# Patient Record
Sex: FEMALE | Race: BLACK OR AFRICAN AMERICAN | URBAN - METROPOLITAN AREA
[De-identification: names, ages, dates, MRNs, and addresses within clinical notes are randomized per-mention and may not be internally consistent; named-entity substitution may affect disease eponyms.]

---

## 2021-02-01 ENCOUNTER — OFFICE VISIT (OUTPATIENT)
Dept: URBAN - METROPOLITAN AREA CLINIC 118 | Facility: CLINIC | Age: 66
End: 2021-02-01

## 2021-03-22 ENCOUNTER — OFFICE VISIT (OUTPATIENT)
Dept: URBAN - METROPOLITAN AREA CLINIC 118 | Facility: CLINIC | Age: 66
End: 2021-03-22
Payer: COMMERCIAL

## 2021-03-22 VITALS
DIASTOLIC BLOOD PRESSURE: 85 MMHG | TEMPERATURE: 96.7 F | WEIGHT: 167 LBS | HEART RATE: 64 BPM | BODY MASS INDEX: 27.82 KG/M2 | HEIGHT: 65 IN | SYSTOLIC BLOOD PRESSURE: 132 MMHG

## 2021-03-22 DIAGNOSIS — K21.00 GASTROESOPHAGEAL REFLUX DISEASE WITH ESOPHAGITIS WITHOUT HEMORRHAGE: ICD-10-CM

## 2021-03-22 DIAGNOSIS — Z86.010 PERSONAL HISTORY OF COLON POLYPS: ICD-10-CM

## 2021-03-22 PROCEDURE — 99213 OFFICE O/P EST LOW 20 MIN: CPT | Performed by: INTERNAL MEDICINE

## 2021-03-22 RX ORDER — PANTOPRAZOLE SODIUM 40 MG/1
1 TABLET TABLET, DELAYED RELEASE ORAL ONCE A DAY
Qty: 90 | Refills: 3 | OUTPATIENT
Start: 2021-03-22

## 2021-03-22 RX ORDER — OMEPRAZOLE 40 MG/1
CAPSULE, DELAYED RELEASE PELLETS ORAL
Qty: 0 | Refills: 0 | Status: ACTIVE | COMMUNITY
Start: 1900-01-01

## 2021-03-22 RX ORDER — AMLODIPINE BESYLATE 10 MG/1
TAKE 1 TABLET (10 MG) BY ORAL ROUTE ONCE DAILY TABLET ORAL 1
Qty: 0 | Refills: 0 | Status: ACTIVE | COMMUNITY
Start: 1900-01-01

## 2021-03-22 RX ORDER — TRAMADOL HYDROCHLORIDE 50 MG/1
TABLET ORAL
Qty: 0 | Refills: 0 | Status: ON HOLD | COMMUNITY
Start: 1900-01-01

## 2021-03-22 RX ORDER — TOPIRAMATE 25 MG/1
TABLET, FILM COATED ORAL
Qty: 0 | Refills: 0 | Status: ACTIVE | COMMUNITY
Start: 1900-01-01

## 2021-03-22 RX ORDER — FAMOTIDINE 40 MG/1
1 TABLET AT BEDTIME TABLET, FILM COATED ORAL ONCE A DAY
Qty: 90 | Refills: 1 | OUTPATIENT
Start: 2021-03-22

## 2021-03-22 RX ORDER — SUMATRIPTAN SUCCINATE 50 MG/1
TABLET, FILM COATED ORAL
Qty: 0 | Refills: 0 | Status: ON HOLD | COMMUNITY
Start: 1900-01-01

## 2021-03-22 RX ORDER — MECLIZINE HYDROCHLORIDE 25 MG/1
TABLET ORAL
Qty: 0 | Refills: 0 | Status: ACTIVE | COMMUNITY
Start: 1900-01-01

## 2021-03-22 RX ORDER — GLIPIZIDE 10 MG/1
TABLET ORAL
Qty: 0 | Refills: 0 | Status: ACTIVE | COMMUNITY
Start: 1900-01-01

## 2021-03-22 NOTE — HPI-TODAY'S VISIT:
patient reports   on omeprazole no help EGD 2019 mild LA Grade A esophagitis   last seen 2019, HP pos, treated quad therapy, repeat testing was negative   On omeprazole without help  her appointment was 12:30 but she was seen aroudn 2 (I asked when she turned in her paperwork as I saw later appointments before her, she didn't want to answer as was annoyed at the long wait)

## 2021-08-28 ENCOUNTER — TELEPHONE ENCOUNTER (OUTPATIENT)
Dept: URBAN - METROPOLITAN AREA CLINIC 13 | Facility: CLINIC | Age: 66
End: 2021-08-28

## 2021-08-29 ENCOUNTER — TELEPHONE ENCOUNTER (OUTPATIENT)
Dept: URBAN - METROPOLITAN AREA CLINIC 13 | Facility: CLINIC | Age: 66
End: 2021-08-29

## 2021-10-12 ENCOUNTER — ERX REFILL RESPONSE (OUTPATIENT)
Dept: URBAN - METROPOLITAN AREA CLINIC 118 | Facility: CLINIC | Age: 66
End: 2021-10-12

## 2021-10-12 RX ORDER — FAMOTIDINE 40 MG/1
1 TABLET AT BEDTIME ONCE A DAY ORALLY 90 DAYS TABLET, FILM COATED ORAL
Qty: 90 TABLET | Refills: 1 | OUTPATIENT

## 2021-10-12 RX ORDER — FAMOTIDINE 40 MG/1
1 TABLET AT BEDTIME TABLET, FILM COATED ORAL ONCE A DAY
Qty: 90 | Refills: 1 | OUTPATIENT

## 2021-10-25 ENCOUNTER — OFFICE VISIT (OUTPATIENT)
Dept: URBAN - METROPOLITAN AREA SURGERY CENTER 23 | Facility: SURGERY CENTER | Age: 66
End: 2021-10-25

## 2021-11-23 ENCOUNTER — OFFICE VISIT (OUTPATIENT)
Dept: URBAN - METROPOLITAN AREA SURGERY CENTER 23 | Facility: SURGERY CENTER | Age: 66
End: 2021-11-23

## 2022-01-25 ENCOUNTER — OFFICE VISIT (OUTPATIENT)
Dept: URBAN - METROPOLITAN AREA SURGERY CENTER 23 | Facility: SURGERY CENTER | Age: 67
End: 2022-01-25

## 2022-02-23 PROBLEM — 428283002 HISTORY OF POLYP OF COLON: Status: ACTIVE | Noted: 2021-03-22

## 2022-03-01 ENCOUNTER — CLAIMS CREATED FROM THE CLAIM WINDOW (OUTPATIENT)
Dept: URBAN - METROPOLITAN AREA SURGERY CENTER 23 | Facility: SURGERY CENTER | Age: 67
End: 2022-03-01

## 2022-03-01 ENCOUNTER — CLAIMS CREATED FROM THE CLAIM WINDOW (OUTPATIENT)
Dept: URBAN - METROPOLITAN AREA CLINIC 4 | Facility: CLINIC | Age: 67
End: 2022-03-01
Payer: COMMERCIAL

## 2022-03-01 ENCOUNTER — CLAIMS CREATED FROM THE CLAIM WINDOW (OUTPATIENT)
Dept: URBAN - METROPOLITAN AREA SURGERY CENTER 23 | Facility: SURGERY CENTER | Age: 67
End: 2022-03-01
Payer: COMMERCIAL

## 2022-03-01 DIAGNOSIS — D12.4 ADENOMA OF DESCENDING COLON: ICD-10-CM

## 2022-03-01 DIAGNOSIS — Z86.010 ADENOMAS PERSONAL HISTORY OF COLONIC POLYPS: ICD-10-CM

## 2022-03-01 DIAGNOSIS — D12.4 BENIGN NEOPLASM OF DESCENDING COLON: ICD-10-CM

## 2022-03-01 PROCEDURE — 88305 TISSUE EXAM BY PATHOLOGIST: CPT | Performed by: PATHOLOGY

## 2022-03-01 PROCEDURE — G8907 PT DOC NO EVENTS ON DISCHARG: HCPCS | Performed by: INTERNAL MEDICINE

## 2022-03-01 PROCEDURE — 45380 COLONOSCOPY AND BIOPSY: CPT | Performed by: INTERNAL MEDICINE

## 2022-03-01 RX ORDER — TRAMADOL HYDROCHLORIDE 50 MG/1
TABLET ORAL
Qty: 0 | Refills: 0 | Status: ON HOLD | COMMUNITY
Start: 1900-01-01

## 2022-03-01 RX ORDER — SUMATRIPTAN SUCCINATE 50 MG/1
TABLET, FILM COATED ORAL
Qty: 0 | Refills: 0 | Status: ON HOLD | COMMUNITY
Start: 1900-01-01

## 2022-03-01 RX ORDER — OMEPRAZOLE 40 MG/1
CAPSULE, DELAYED RELEASE PELLETS ORAL
Qty: 0 | Refills: 0 | Status: ACTIVE | COMMUNITY
Start: 1900-01-01

## 2022-03-01 RX ORDER — FAMOTIDINE 40 MG/1
1 TABLET AT BEDTIME ONCE A DAY ORALLY 90 DAYS TABLET, FILM COATED ORAL
Qty: 90 TABLET | Refills: 1 | Status: ACTIVE | COMMUNITY

## 2022-03-01 RX ORDER — GLIPIZIDE 10 MG/1
TABLET ORAL
Qty: 0 | Refills: 0 | Status: ACTIVE | COMMUNITY
Start: 1900-01-01

## 2022-03-01 RX ORDER — TOPIRAMATE 25 MG/1
TABLET, FILM COATED ORAL
Qty: 0 | Refills: 0 | Status: ACTIVE | COMMUNITY
Start: 1900-01-01

## 2022-03-01 RX ORDER — PANTOPRAZOLE SODIUM 40 MG/1
1 TABLET TABLET, DELAYED RELEASE ORAL ONCE A DAY
Qty: 90 | Refills: 3 | Status: ACTIVE | COMMUNITY
Start: 2021-03-22

## 2022-03-01 RX ORDER — MECLIZINE HYDROCHLORIDE 25 MG/1
TABLET ORAL
Qty: 0 | Refills: 0 | Status: ACTIVE | COMMUNITY
Start: 1900-01-01

## 2022-03-01 RX ORDER — AMLODIPINE BESYLATE 10 MG/1
TAKE 1 TABLET (10 MG) BY ORAL ROUTE ONCE DAILY TABLET ORAL 1
Qty: 0 | Refills: 0 | Status: ACTIVE | COMMUNITY
Start: 1900-01-01

## 2022-03-28 ENCOUNTER — ERX REFILL RESPONSE (OUTPATIENT)
Dept: URBAN - METROPOLITAN AREA CLINIC 118 | Facility: CLINIC | Age: 67
End: 2022-03-28

## 2022-03-28 RX ORDER — PANTOPRAZOLE 40 MG/1
1 TABLET ONCE A DAY ORALLY 90 DAYS TABLET, DELAYED RELEASE ORAL
Qty: 90 TABLET | Refills: 3 | OUTPATIENT

## 2022-03-28 RX ORDER — PANTOPRAZOLE SODIUM 40 MG/1
1 TABLET TABLET, DELAYED RELEASE ORAL ONCE A DAY
Qty: 90 | Refills: 3 | OUTPATIENT

## 2022-04-04 ENCOUNTER — ERX REFILL RESPONSE (OUTPATIENT)
Dept: URBAN - METROPOLITAN AREA CLINIC 118 | Facility: CLINIC | Age: 67
End: 2022-04-04

## 2022-04-04 RX ORDER — FAMOTIDINE 40 MG/1
1 TABLET AT BEDTIME ONCE A DAY ORALLY 90 DAYS TABLET, FILM COATED ORAL
Qty: 90 TABLET | Refills: 1 | OUTPATIENT

## 2022-07-21 ENCOUNTER — ERX REFILL RESPONSE (OUTPATIENT)
Dept: URBAN - METROPOLITAN AREA CLINIC 118 | Facility: CLINIC | Age: 67
End: 2022-07-21

## 2022-07-21 RX ORDER — FAMOTIDINE 40 MG/1
1 TABLET AT BEDTIME ONCE A DAY ORALLY 90 DAYS TABLET, FILM COATED ORAL
Qty: 90 TABLET | Refills: 1 | OUTPATIENT

## 2022-07-21 RX ORDER — FAMOTIDINE 40 MG/1
1 TABLET AT BEDTIME ONCE A DAY ORALLY 90 DAYS TABLET, FILM COATED ORAL
Qty: 90 TABLET | Refills: 0 | OUTPATIENT

## 2022-11-08 ENCOUNTER — ERX REFILL RESPONSE (OUTPATIENT)
Dept: URBAN - METROPOLITAN AREA CLINIC 118 | Facility: CLINIC | Age: 67
End: 2022-11-08

## 2022-11-08 RX ORDER — PANTOPRAZOLE 40 MG/1
1 TABLET ONCE A DAY ORALLY 90 DAYS TABLET, DELAYED RELEASE ORAL
Qty: 90 TABLET | Refills: 3 | OUTPATIENT

## 2022-11-08 RX ORDER — PANTOPRAZOLE SODIUM 40 MG/1
1 TABLET ONCE A DAY ORALLY 90 DAYS TABLET, DELAYED RELEASE ORAL
Qty: 90 TABLET | Refills: 0 | OUTPATIENT

## 2023-10-19 ENCOUNTER — DASHBOARD ENCOUNTERS (OUTPATIENT)
Age: 68
End: 2023-10-19

## 2023-10-23 ENCOUNTER — CLAIMS CREATED FROM THE CLAIM WINDOW (OUTPATIENT)
Dept: URBAN - METROPOLITAN AREA CLINIC 48 | Facility: CLINIC | Age: 68
End: 2023-10-23
Payer: COMMERCIAL

## 2023-10-23 ENCOUNTER — LAB OUTSIDE AN ENCOUNTER (OUTPATIENT)
Dept: URBAN - METROPOLITAN AREA CLINIC 48 | Facility: CLINIC | Age: 68
End: 2023-10-23

## 2023-10-23 DIAGNOSIS — K44.9 HIATAL HERNIA: ICD-10-CM

## 2023-10-23 DIAGNOSIS — K21.9 GASTROESOPHAGEAL REFLUX DISEASE WITHOUT ESOPHAGITIS: ICD-10-CM

## 2023-10-23 DIAGNOSIS — R19.4 CHANGE IN BOWEL HABIT: ICD-10-CM

## 2023-10-23 DIAGNOSIS — R11.0 NAUSEA: ICD-10-CM

## 2023-10-23 DIAGNOSIS — R14.0 ABDOMINAL BLOATING: ICD-10-CM

## 2023-10-23 DIAGNOSIS — R10.13 EPIGASTRIC PAIN: ICD-10-CM

## 2023-10-23 PROBLEM — 116289008: Status: ACTIVE | Noted: 2023-10-23

## 2023-10-23 PROBLEM — 79922009: Status: ACTIVE | Noted: 2023-10-23

## 2023-10-23 PROBLEM — 266435005: Status: ACTIVE | Noted: 2023-10-23

## 2023-10-23 PROBLEM — 84089009: Status: ACTIVE | Noted: 2023-10-23

## 2023-10-23 PROBLEM — 422587007: Status: ACTIVE | Noted: 2023-10-23

## 2023-10-23 PROCEDURE — 99204 OFFICE O/P NEW MOD 45 MIN: CPT | Performed by: NURSE PRACTITIONER

## 2023-10-23 RX ORDER — ONDANSETRON HYDROCHLORIDE 4 MG/1
1 TABLET AS NEEDED FOR NAUSEA TABLET, FILM COATED ORAL ONCE A DAY
Qty: 30 TABLET | Refills: 1 | OUTPATIENT
Start: 2023-10-23

## 2023-10-23 RX ORDER — GLIPIZIDE 10 MG/1
1 TABLET WITH BREAKFAST TABLET, EXTENDED RELEASE ORAL ONCE A DAY
Status: ACTIVE | COMMUNITY

## 2023-10-23 RX ORDER — GABAPENTIN 300 MG/1
1 CAPSULE CAPSULE ORAL
Status: ACTIVE | COMMUNITY

## 2023-10-23 RX ORDER — SUMATRIPTAN SUCCINATE 50 MG/1
1 TABLET AT LEAST 2 HOURS BETWEEN DOSES AS NEEDED TABLET, FILM COATED ORAL TWICE A DAY
Refills: 0 | Status: ACTIVE | COMMUNITY
Start: 1900-01-01

## 2023-10-23 RX ORDER — ERGOCALCIFEROL 1.25 MG/1
1 CAPSULE CAPSULE ORAL
Status: ACTIVE | COMMUNITY

## 2023-10-23 RX ORDER — PANTOPRAZOLE SODIUM 40 MG/1
1 TABLET TABLET, DELAYED RELEASE ORAL ONCE A DAY
Qty: 90 TABLET | Refills: 0 | Status: ACTIVE | COMMUNITY

## 2023-10-23 RX ORDER — ETODOLAC 400 MG/1
1 TABLET WITH FOOD TABLET, COATED ORAL TWICE A DAY
Status: ACTIVE | COMMUNITY

## 2023-10-23 RX ORDER — CHLORHEXIDINE GLUCONATE 1.2 MG/ML
15 ML RINSE ORAL TWICE A DAY
Status: ACTIVE | COMMUNITY

## 2023-10-23 RX ORDER — LUBIPROSTONE 24 UG/1
1 CAPSULE WITH FOOD AND WATER CAPSULE, GELATIN COATED ORAL TWICE A DAY
Status: ACTIVE | COMMUNITY

## 2023-10-23 RX ORDER — METFORMIN HYDROCHLORIDE 500 MG/1
1 TABLET WITH EVENING MEAL TABLET, EXTENDED RELEASE ORAL ONCE A DAY
Status: ACTIVE | COMMUNITY

## 2023-10-23 RX ORDER — TOPIRAMATE 25 MG/1
1 TABLET TABLET, FILM COATED ORAL TWICE DAILY
Refills: 0 | Status: ACTIVE | COMMUNITY
Start: 1900-01-01

## 2023-10-23 RX ORDER — L. ACIDOPHILUS/L.BULGARICUS 1MM CELL
AS DIRECTED TABLET ORAL
Status: ACTIVE | COMMUNITY

## 2023-10-23 RX ORDER — MECLIZINE HYDROCHLORIDE 25 MG/1
1 TABLET AS NEEDED TABLET ORAL
Refills: 0 | Status: ACTIVE | COMMUNITY
Start: 1900-01-01

## 2023-10-23 RX ORDER — MELOXICAM 15 MG/1
1 TABLET TABLET ORAL ONCE A DAY
Status: ACTIVE | COMMUNITY

## 2023-10-23 RX ORDER — TRAMADOL HYDROCHLORIDE 50 MG/1
1 TABLET AS NEEDED TABLET ORAL
Refills: 0 | Status: ACTIVE | COMMUNITY
Start: 1900-01-01

## 2023-10-23 RX ORDER — FLUOXETINE 10 MG/1
1 CAPSULE CAPSULE ORAL ONCE A DAY
Status: ACTIVE | COMMUNITY

## 2023-10-23 RX ORDER — AMLODIPINE BESYLATE 10 MG/1
TAKE 1 TABLET (10 MG) BY ORAL ROUTE ONCE DAILY TABLET ORAL 1
Qty: 0 | Refills: 0 | Status: ACTIVE | COMMUNITY
Start: 1900-01-01

## 2023-10-23 RX ORDER — FAMOTIDINE 40 MG/1
1 TABLET AT BEDTIME TABLET, FILM COATED ORAL ONCE A DAY
Qty: 90 TABLET | Refills: 0 | Status: ACTIVE | COMMUNITY

## 2023-10-23 NOTE — HPI-TODAY'S VISIT:
68 year old female presents for evaluation of abdominal pain. Pain is in the epigastric region. Eating can trigger pain. She has noted abdominal bloating daily and has morning nausea. GERD is controlled taking Pantoprazole or Famotidine daily. Admits to taking Meloxicam but has not taken in 1 week. Last EGD was in 2019 and showed Grade A esophagitis, a small hiatal hernia, and gastritis. Path was positive for H. pylori. 8/2023 CBC, CMP, and TSh were unremarkable. Bowel movements are alternating between daily to constipation of formed stool. The patient has tried stool softener in the past. Last colon was 3/2022 and showed an adenomatous polyp.

## 2023-11-09 ENCOUNTER — OFFICE VISIT (OUTPATIENT)
Dept: URBAN - METROPOLITAN AREA SURGERY CENTER 27 | Facility: SURGERY CENTER | Age: 68
End: 2023-11-09

## 2023-11-17 ENCOUNTER — OFFICE VISIT (OUTPATIENT)
Dept: URBAN - METROPOLITAN AREA SURGERY CENTER 27 | Facility: SURGERY CENTER | Age: 68
End: 2023-11-17

## 2023-12-08 ENCOUNTER — OUT OF OFFICE VISIT (OUTPATIENT)
Dept: URBAN - METROPOLITAN AREA SURGERY CENTER 27 | Facility: SURGERY CENTER | Age: 68
End: 2023-12-08
Payer: COMMERCIAL

## 2023-12-08 ENCOUNTER — CLAIMS CREATED FROM THE CLAIM WINDOW (OUTPATIENT)
Dept: URBAN - METROPOLITAN AREA CLINIC 4 | Facility: CLINIC | Age: 68
End: 2023-12-08
Payer: COMMERCIAL

## 2023-12-08 DIAGNOSIS — K22.2 ACQUIRED ESOPHAGEAL RING: ICD-10-CM

## 2023-12-08 DIAGNOSIS — K30 FUNCTIONAL DYSPEPSIA: ICD-10-CM

## 2023-12-08 DIAGNOSIS — K29.70 GASTRITIS, UNSPECIFIED, WITHOUT BLEEDING: ICD-10-CM

## 2023-12-08 DIAGNOSIS — K30 ACID INDIGESTION: ICD-10-CM

## 2023-12-08 DIAGNOSIS — K29.70 CHRONIC ACITVE GASTRITIS (H.PYLORI NEGATIVE): ICD-10-CM

## 2023-12-08 DIAGNOSIS — K31.89 EROSIVE GASTROPATHY: ICD-10-CM

## 2023-12-08 DIAGNOSIS — Z12.11 COLON CANCER SCREENING (HIGH RISK): ICD-10-CM

## 2023-12-08 DIAGNOSIS — R10.13 EPIGASTRIC PAIN: ICD-10-CM

## 2023-12-08 PROCEDURE — 43239 EGD BIOPSY SINGLE/MULTIPLE: CPT | Performed by: INTERNAL MEDICINE

## 2023-12-08 PROCEDURE — G8907 PT DOC NO EVENTS ON DISCHARG: HCPCS | Performed by: INTERNAL MEDICINE

## 2023-12-08 PROCEDURE — 88305 TISSUE EXAM BY PATHOLOGIST: CPT | Performed by: PATHOLOGY

## 2023-12-08 PROCEDURE — 88312 SPECIAL STAINS GROUP 1: CPT | Performed by: PATHOLOGY

## 2023-12-08 PROCEDURE — 00731 ANES UPR GI NDSC PX NOS: CPT | Performed by: ANESTHESIOLOGY

## 2023-12-08 RX ORDER — L. ACIDOPHILUS/L.BULGARICUS 1MM CELL
AS DIRECTED TABLET ORAL
Status: ACTIVE | COMMUNITY

## 2023-12-08 RX ORDER — METFORMIN HYDROCHLORIDE 500 MG/1
1 TABLET WITH EVENING MEAL TABLET, EXTENDED RELEASE ORAL ONCE A DAY
Status: ACTIVE | COMMUNITY

## 2023-12-08 RX ORDER — TOPIRAMATE 25 MG/1
1 TABLET TABLET, FILM COATED ORAL TWICE DAILY
Refills: 0 | Status: ACTIVE | COMMUNITY
Start: 1900-01-01

## 2023-12-08 RX ORDER — GABAPENTIN 300 MG/1
1 CAPSULE CAPSULE ORAL
Status: ACTIVE | COMMUNITY

## 2023-12-08 RX ORDER — MELOXICAM 15 MG/1
1 TABLET TABLET ORAL ONCE A DAY
Status: ACTIVE | COMMUNITY

## 2023-12-08 RX ORDER — AMLODIPINE BESYLATE 10 MG/1
TAKE 1 TABLET (10 MG) BY ORAL ROUTE ONCE DAILY TABLET ORAL 1
Qty: 0 | Refills: 0 | Status: ACTIVE | COMMUNITY
Start: 1900-01-01

## 2023-12-08 RX ORDER — MECLIZINE HYDROCHLORIDE 25 MG/1
1 TABLET AS NEEDED TABLET ORAL
Refills: 0 | Status: ACTIVE | COMMUNITY
Start: 1900-01-01

## 2023-12-08 RX ORDER — TRAMADOL HYDROCHLORIDE 50 MG/1
1 TABLET AS NEEDED TABLET ORAL
Refills: 0 | Status: ACTIVE | COMMUNITY
Start: 1900-01-01

## 2023-12-08 RX ORDER — ONDANSETRON HYDROCHLORIDE 4 MG/1
1 TABLET AS NEEDED FOR NAUSEA TABLET, FILM COATED ORAL ONCE A DAY
Qty: 30 TABLET | Refills: 1 | Status: ACTIVE | COMMUNITY
Start: 2023-10-23

## 2023-12-08 RX ORDER — LUBIPROSTONE 24 UG/1
1 CAPSULE WITH FOOD AND WATER CAPSULE, GELATIN COATED ORAL TWICE A DAY
Status: ACTIVE | COMMUNITY

## 2023-12-08 RX ORDER — FAMOTIDINE 40 MG/1
1 TABLET AT BEDTIME TABLET, FILM COATED ORAL ONCE A DAY
Qty: 90 TABLET | Refills: 0 | Status: ACTIVE | COMMUNITY

## 2023-12-08 RX ORDER — ETODOLAC 400 MG/1
1 TABLET WITH FOOD TABLET, COATED ORAL TWICE A DAY
Status: ACTIVE | COMMUNITY

## 2023-12-08 RX ORDER — CHLORHEXIDINE GLUCONATE 1.2 MG/ML
15 ML RINSE ORAL TWICE A DAY
Status: ACTIVE | COMMUNITY

## 2023-12-08 RX ORDER — PANTOPRAZOLE SODIUM 40 MG/1
1 TABLET TABLET, DELAYED RELEASE ORAL ONCE A DAY
Qty: 90 TABLET | Refills: 0 | Status: ACTIVE | COMMUNITY

## 2023-12-08 RX ORDER — GLIPIZIDE 10 MG/1
1 TABLET WITH BREAKFAST TABLET, EXTENDED RELEASE ORAL ONCE A DAY
Status: ACTIVE | COMMUNITY

## 2023-12-08 RX ORDER — ERGOCALCIFEROL 1.25 MG/1
1 CAPSULE CAPSULE ORAL
Status: ACTIVE | COMMUNITY

## 2023-12-08 RX ORDER — SUMATRIPTAN SUCCINATE 50 MG/1
1 TABLET AT LEAST 2 HOURS BETWEEN DOSES AS NEEDED TABLET, FILM COATED ORAL TWICE A DAY
Refills: 0 | Status: ACTIVE | COMMUNITY
Start: 1900-01-01

## 2023-12-08 RX ORDER — FLUOXETINE 10 MG/1
1 CAPSULE CAPSULE ORAL ONCE A DAY
Status: ACTIVE | COMMUNITY